# Patient Record
Sex: MALE | Race: BLACK OR AFRICAN AMERICAN | NOT HISPANIC OR LATINO | URBAN - METROPOLITAN AREA
[De-identification: names, ages, dates, MRNs, and addresses within clinical notes are randomized per-mention and may not be internally consistent; named-entity substitution may affect disease eponyms.]

---

## 2020-01-01 ENCOUNTER — INPATIENT (INPATIENT)
Facility: HOSPITAL | Age: 80
LOS: 1 days | End: 2020-04-05
Attending: INTERNAL MEDICINE
Payer: MEDICARE

## 2020-01-01 VITALS
OXYGEN SATURATION: 84 % | RESPIRATION RATE: 22 BRPM | SYSTOLIC BLOOD PRESSURE: 146 MMHG | TEMPERATURE: 98 F | DIASTOLIC BLOOD PRESSURE: 76 MMHG | HEART RATE: 81 BPM

## 2020-01-01 VITALS
TEMPERATURE: 97 F | DIASTOLIC BLOOD PRESSURE: 56 MMHG | RESPIRATION RATE: 13 BRPM | OXYGEN SATURATION: 88 % | HEART RATE: 66 BPM | SYSTOLIC BLOOD PRESSURE: 86 MMHG

## 2020-01-01 DIAGNOSIS — J96.01 ACUTE RESPIRATORY FAILURE WITH HYPOXIA: ICD-10-CM

## 2020-01-01 DIAGNOSIS — R06.00 DYSPNEA, UNSPECIFIED: ICD-10-CM

## 2020-01-01 PROCEDURE — 99233 SBSQ HOSP IP/OBS HIGH 50: CPT

## 2020-01-01 RX ORDER — MORPHINE SULFATE 50 MG/1
1 CAPSULE, EXTENDED RELEASE ORAL
Qty: 100 | Refills: 0 | Status: DISCONTINUED | OUTPATIENT
Start: 2020-01-01 | End: 2020-01-01

## 2020-01-01 RX ORDER — MONTELUKAST 4 MG/1
1 TABLET, CHEWABLE ORAL
Qty: 0 | Refills: 0 | DISCHARGE

## 2020-01-01 RX ORDER — MORPHINE SULFATE 50 MG/1
4 CAPSULE, EXTENDED RELEASE ORAL ONCE
Refills: 0 | Status: DISCONTINUED | OUTPATIENT
Start: 2020-01-01 | End: 2020-01-01

## 2020-01-01 RX ORDER — MORPHINE SULFATE 50 MG/1
4 CAPSULE, EXTENDED RELEASE ORAL
Refills: 0 | Status: DISCONTINUED | OUTPATIENT
Start: 2020-01-01 | End: 2020-01-01

## 2020-01-01 RX ORDER — METOPROLOL TARTRATE 50 MG
1 TABLET ORAL
Qty: 0 | Refills: 0 | DISCHARGE

## 2020-01-01 RX ORDER — MORPHINE SULFATE 50 MG/1
2 CAPSULE, EXTENDED RELEASE ORAL ONCE
Refills: 0 | Status: DISCONTINUED | OUTPATIENT
Start: 2020-01-01 | End: 2020-01-01

## 2020-01-01 RX ORDER — MORPHINE SULFATE 50 MG/1
0.5 CAPSULE, EXTENDED RELEASE ORAL
Qty: 100 | Refills: 0 | Status: DISCONTINUED | OUTPATIENT
Start: 2020-01-01 | End: 2020-01-01

## 2020-01-01 RX ORDER — SIMVASTATIN 20 MG/1
1 TABLET, FILM COATED ORAL
Qty: 0 | Refills: 0 | DISCHARGE

## 2020-01-01 RX ORDER — TIOTROPIUM BROMIDE 18 UG/1
1 CAPSULE ORAL; RESPIRATORY (INHALATION)
Qty: 0 | Refills: 0 | DISCHARGE

## 2020-01-01 RX ORDER — AMLODIPINE BESYLATE 2.5 MG/1
1 TABLET ORAL
Qty: 0 | Refills: 0 | DISCHARGE

## 2020-01-01 RX ORDER — ASPIRIN/CALCIUM CARB/MAGNESIUM 324 MG
1 TABLET ORAL
Qty: 0 | Refills: 0 | DISCHARGE

## 2020-01-01 RX ORDER — CILOSTAZOL 100 MG/1
1 TABLET ORAL
Qty: 0 | Refills: 0 | DISCHARGE

## 2020-01-01 RX ORDER — FENTANYL CITRATE 50 UG/ML
0.44 INJECTION INTRAVENOUS
Qty: 2500 | Refills: 0 | Status: DISCONTINUED | OUTPATIENT
Start: 2020-01-01 | End: 2020-01-01

## 2020-01-01 RX ORDER — SCOPALAMINE 1 MG/3D
1 PATCH, EXTENDED RELEASE TRANSDERMAL
Refills: 0 | Status: DISCONTINUED | OUTPATIENT
Start: 2020-01-01 | End: 2020-01-01

## 2020-01-01 RX ORDER — ALBUTEROL 90 UG/1
2 AEROSOL, METERED ORAL
Qty: 0 | Refills: 0 | DISCHARGE

## 2020-01-01 RX ORDER — AZITHROMYCIN 500 MG/1
500 TABLET, FILM COATED ORAL
Qty: 0 | Refills: 0 | DISCHARGE

## 2020-01-01 RX ORDER — ROBINUL 0.2 MG/ML
0.2 INJECTION INTRAMUSCULAR; INTRAVENOUS ONCE
Refills: 0 | Status: COMPLETED | OUTPATIENT
Start: 2020-01-01 | End: 2020-01-01

## 2020-01-01 RX ORDER — ROBINUL 0.2 MG/ML
0.2 INJECTION INTRAMUSCULAR; INTRAVENOUS EVERY 6 HOURS
Refills: 0 | Status: DISCONTINUED | OUTPATIENT
Start: 2020-01-01 | End: 2020-01-01

## 2020-01-01 RX ORDER — CLOPIDOGREL BISULFATE 75 MG/1
1 TABLET, FILM COATED ORAL
Qty: 0 | Refills: 0 | DISCHARGE

## 2020-01-01 RX ORDER — LISINOPRIL 2.5 MG/1
1 TABLET ORAL
Qty: 0 | Refills: 0 | DISCHARGE

## 2020-01-01 RX ORDER — FENTANYL CITRATE 50 UG/ML
0.5 INJECTION INTRAVENOUS
Qty: 2500 | Refills: 0 | Status: DISCONTINUED | OUTPATIENT
Start: 2020-01-01 | End: 2020-01-01

## 2020-01-01 RX ADMIN — MORPHINE SULFATE 2 MILLIGRAM(S): 50 CAPSULE, EXTENDED RELEASE ORAL at 13:06

## 2020-01-01 RX ADMIN — ROBINUL 0.2 MILLIGRAM(S): 0.2 INJECTION INTRAMUSCULAR; INTRAVENOUS at 14:05

## 2020-01-01 RX ADMIN — MORPHINE SULFATE 4 MILLIGRAM(S): 50 CAPSULE, EXTENDED RELEASE ORAL at 01:54

## 2020-01-01 RX ADMIN — FENTANYL CITRATE 3.5 MICROGRAM(S)/KG/HR: 50 INJECTION INTRAVENOUS at 19:25

## 2020-01-01 RX ADMIN — MORPHINE SULFATE 2 MILLIGRAM(S): 50 CAPSULE, EXTENDED RELEASE ORAL at 15:53

## 2020-01-01 RX ADMIN — SCOPALAMINE 1 PATCH: 1 PATCH, EXTENDED RELEASE TRANSDERMAL at 19:01

## 2020-01-01 RX ADMIN — MORPHINE SULFATE 0.5 MG/HR: 50 CAPSULE, EXTENDED RELEASE ORAL at 01:43

## 2020-01-01 RX ADMIN — SCOPALAMINE 1 PATCH: 1 PATCH, EXTENDED RELEASE TRANSDERMAL at 10:28

## 2020-01-01 RX ADMIN — SCOPALAMINE 1 PATCH: 1 PATCH, EXTENDED RELEASE TRANSDERMAL at 07:41

## 2020-01-01 RX ADMIN — MORPHINE SULFATE 4 MILLIGRAM(S): 50 CAPSULE, EXTENDED RELEASE ORAL at 21:01

## 2020-01-01 RX ADMIN — MORPHINE SULFATE 0.5 MG/HR: 50 CAPSULE, EXTENDED RELEASE ORAL at 00:10

## 2020-01-01 RX ADMIN — ROBINUL 0.2 MILLIGRAM(S): 0.2 INJECTION INTRAMUSCULAR; INTRAVENOUS at 01:54

## 2020-01-01 RX ADMIN — MORPHINE SULFATE 2 MILLIGRAM(S): 50 CAPSULE, EXTENDED RELEASE ORAL at 19:25

## 2020-04-03 NOTE — ED PROVIDER NOTE - OBJECTIVE STATEMENT
80yo M with hx of CVA - HTN/HLD, emphysema presents with SOB. Was found to be AMS and hypoxia to 50s, was given oxygen and solucortef. Called niece despite DNR/DNI/do not hospitalize status - EMS was called and brought to hospital.

## 2020-04-03 NOTE — H&P ADULT - HISTORY OF PRESENT ILLNESS
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  In ED, placed on NRB, O2 then 85%.  ED spoke  to niece at length, patient verbalized previously that he does not want to be resuscitated or be put on any machine and would like comfort care.  Patient was DNR as per wife in 2015 as well. 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 Patient was started on azithromycin 500 mg IV daily and prednisone 20 mg PO daily on 4/3.  In ED, placed on NRB, O2 then 85%.  ED spoke  to niece at length, patient verbalized previously that he does not want to be resuscitated or be put on any machine and would like comfort care.  Patient was DNR as per wife in 2015 as well. 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 Patient was started on azithromycin 500 mg IV daily and prednisone 20 mg PO daily on 4/3.  In ED, placed on NRB, O2 then 85%.  ED spoke  to niece at length, patient verbalized previously that he does not want to be resuscitated or be put on any machine and would like comfort care.  Patient was DNR as per wife in 2015 as well.

## 2020-04-03 NOTE — ED ADULT NURSE NOTE - OBJECTIVE STATEMENT
Pt presents to ED from [facility] for shortness of breath. Pt AxOx2. pt oriented to self and place at this time. pt arrived to room 3 with ems. Pt tachypneic in the 30's with an O2 sat of 85% on nonrebreather. Pt suctioned and chest PT performed. Oxygen saturation improved to 91% on nonrebreather. pt continues to be tachypneic at this time. Stage 2 Pressure ulcer noted to left side of buttocks and sacral area. left leg wrapped in ace bandage with guaze underneath. +2 bilateral pedal edema noted. Pt arrived with 24G IVL in R arm, clear and patent at this time. Comfort care measures to be provided at this time. pt DNR/DNI as stated in MOLST form in chart. Will continue to monitor.

## 2020-04-03 NOTE — H&P ADULT - PROBLEM SELECTOR PLAN 1
Likely 2/2 COVID-19, comfort care at this time, very poor prognosis, hours to days. ED spoke  to niece at length, patient verbalized previously that he does not want to be resuscitated or be put on any machine and would like comfort care.  Patient was DNR as per wife in 2015 as well.  Further testing will not change mgmt or outcome, c/w fentanyl gtt, will give morphine 4 mg IV X1 STAT (d/w RN) as patient in rep distress despite morphine 2 mg IV previously. Likely 2/2 COVID-19, comfort care at this time, very poor prognosis, hours to days. ED spoke  to niece at length, patient verbalized previously that he does not want to be resuscitated or be put on any machine and would like comfort care.  HATTIE in chart signed by Dr. Cline on 2/19/2020  DNR/DNI, comfort measures only  Patient was DNR as per wife in 2015 as well.  Further testing will not change mgmt or outcome, c/w fentanyl gtt, will give morphine 4 mg IV X1 STAT (d/w RN) as patient in rep distress despite morphine 2 mg IV previously.

## 2020-04-03 NOTE — ED PROVIDER NOTE - PROGRESS NOTE DETAILS
Mayra Combs M.D. PGY3  Spoke with niece at length - patient verbalized previously that he does not want to be resuscitated or be put on any machine and would like comfort care. Will give medications here in the ED for comfort, patient and niece agrees with plan. MOLST form in chart. Jacob PGY3: pt signed out to me, plan for comfort care, pt dnr/dni with agonal breathing on O2 supplementation, on reassessment appears very  uncomfortable, will redose pain medications Jacob PGY3: pt unchanged, vitals stable, Resp rate 18, occasional tachypnea, will admit under Dr Shaikh service for comfort care, palliative paged, no call back yet James PGY3: spoke w/ palliative, ok to use morphine prn RR> 25, ativan 0.25mg IV prn agonal breathing or robinul.

## 2020-04-03 NOTE — H&P ADULT - NSHPPHYSICALEXAM_GEN_ALL_CORE
PHYSICAL EXAM:   · Physical Examination: Gen: AOx2  	Head: NCAT  	HEENT: PERRL, MMM, normal conjunctiva, anicteric, neck supple  	Lung: crackles bilaterally  	CV: tachycardic  	Abd: soft, NTND, no rebound or guarding, no CVAT  MSK: LLE contracted, wrapped

## 2020-04-03 NOTE — ED PROVIDER NOTE - ATTENDING CONTRIBUTION TO CARE
79 year old  male with copd presents with resp distress. patient dnr/dni. appearsa to be actively dying. molst confirmed with hcp. will treat symptoms

## 2020-04-03 NOTE — ED PROVIDER NOTE - PHYSICAL EXAMINATION
Gen: AOx2  Head: NCAT  HEENT: PERRL, MMM, normal conjunctiva, anicteric, neck supple  Lung: crackles bilaterally  CV: tachycardic  Abd: soft, NTND, no rebound or guarding, no CVAT  MSK: LLE contracted, wrapped

## 2020-04-03 NOTE — ED ADULT TRIAGE NOTE - CHIEF COMPLAINT QUOTE
Pt brought in by EMS from NH for hypoxia, sob, fever. Pts SpO2 was 50% in NH. Pt placed on non rebreather pO2 84%. Pt has a hx of copd and wears 2L NC at baseline. Pt appears tachypneic.

## 2020-04-03 NOTE — H&P ADULT - NSICDXPASTMEDICALHX_GEN_ALL_CORE_FT
PAST MEDICAL HISTORY:  CVA, old, speech/language deficit     Essential hypertension     Hyperlipidemia     Other emphysema

## 2020-04-04 NOTE — PROGRESS NOTE ADULT - ASSESSMENT
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

## 2020-04-04 NOTE — PROGRESS NOTE ADULT - PROBLEM SELECTOR PLAN 1
Likely 2/2 COVID-19, comfort care at this time, very poor prognosis, hours to days. ED spoke  to niece at length, patient verbalized previously that he does not want to be resuscitated or be put on any machine and would like comfort care.  DNR/DNI, comfort measures only  Patient was DNR as per wife in 2015 as well.  Further testing will not change mgmt or outcome, c/w fentanyl gtt, will give morphine 4 mg IV X1 STAT (d/w RN) as patient in rep distress despite morphine 2 mg IV previously.

## 2020-04-04 NOTE — PROGRESS NOTE ADULT - SUBJECTIVE AND OBJECTIVE BOX
Patient is a 79y old  Male who presents with a chief complaint of SOB (03 Apr 2020 21:02)      SUBJECTIVE / OVERNIGHT EVENTS:    Events noted.  Nonverbal  On comfort care    MEDICATIONS  (STANDING):  morphine  Infusion 0.5 mG/Hr (0.5 mL/Hr) IV Continuous <Continuous>  scopolamine   Patch 1 Patch Transdermal every 72 hours    MEDICATIONS  (PRN):  glycopyrrolate Injectable 0.2 milliGRAM(s) IV Push every 6 hours PRN secretions  LORazepam   Injectable 1 milliGRAM(s) IV Push every 4 hours PRN respiratory distress  morphine  - Injectable 4 milliGRAM(s) IV Push every 2 hours PRN respiratory distress        CAPILLARY BLOOD GLUCOSE        I&O's Summary      PHYSICAL EXAM:  Not in distress    CHEST/LUNG: Clear to auscultation bilaterally; No wheezing.  HEART: Regular rate and rhythm; No murmurs, rubs, or gallops  ABDOMEN: Soft, Nontender, Nondistended; Bowel sounds present  EXTREMITIES:   No edema  NEUROLOGY: Nonverbal      LABS:                  CAPILLARY BLOOD GLUCOSE                    RADIOLOGY & ADDITIONAL TESTS:    Imaging Personally Reviewed:    Consultant(s) Notes Reviewed:      Care Discussed with Consultants/Other Providers:

## 2020-04-05 NOTE — DISCHARGE NOTE FOR THE EXPIRED PATIENT - HOSPITAL COURSE
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 Per discussion w/ family decided Comfort Care, pt was on morphine drip.  devon Rodriguez informed [phone] as well as Dr Cline attending

## 2020-04-05 NOTE — DISCHARGE NOTE FOR THE EXPIRED PATIENT - SECONDARY DIAGNOSIS.
COVID-19 virus infection Other emphysema CVA, old, speech/language deficit Essential hypertension Hyperlipidemia
